# Patient Record
(demographics unavailable — no encounter records)

---

## 2025-05-20 NOTE — CONSULT LETTER
[Dear  ___] : Dear  [unfilled], [Consult Letter:] : I had the pleasure of evaluating your patient, [unfilled]. [Please see my note below.] : Please see my note below. [Consult Closing:] : Thank you very much for allowing me to participate in the care of this patient.  If you have any questions, please do not hesitate to contact me. [Sincerely,] : Sincerely, [Dear  ___] : Dear ~PEDRO, [FreeTextEntry3] : Lori Casiano M.D Pediatric neurology attending Neurofibromatosis clinic Co-director [facility] Tel: [phone] Fax: [phone]

## 2025-05-20 NOTE — BIRTH HISTORY
[At Term] : at term [ Section] : by  section [None] : there were no delivery complications [de-identified] : for breech presentation [FreeTextEntry1] : 8-9

## 2025-05-20 NOTE — ASSESSMENT
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 Exam non focal.  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

## 2025-05-20 NOTE — PLAN
[FreeTextEntry1] : Parents to call for test results All questions answered; parents report understanding    Addendum: 05/20/2025 20:34  Called [phone]; got voice mail box; left message that I will order the Brain and L spine MRI; prior auth will be obtained, and MRI should be scheduled (MRI order placed in EMR)

## 2025-05-20 NOTE — PHYSICAL EXAM
[Well-appearing] : well-appearing [Anterior fontanel- Open] : anterior fontanel- open [Anterior fontanel- Soft] : anterior fontanel- soft [Anterior fontanel- Flat] : anterior fontanel- flat [No dysmorphic facial features] : no dysmorphic facial features [Soft] : soft [Straight] : straight [No waleska or dimples] : no waleska or dimples [No deformities] : no deformities [Alert] : alert [Normal axial and appendicular muscle tone with symmetric limb movements] : normal axial and appendicular muscle tone with symmetric limb movements [2+ biceps] : 2+ biceps [Knee jerks] : knee jerks [Responds to touch and tickle] : responds to touch and tickle [de-identified] : awake, alert, in NAD [de-identified] : slight frontal bossing [de-identified] : hemangioma over the nape of neck and up towards occipital head region 8 cm in length; another hemangioma lower back, midline [de-identified] : eyes are not midline aligned on primary gaze [de-identified] : moving all 4 extremities equally

## 2025-05-20 NOTE — BIRTH HISTORY
[At Term] : at term [ Section] : by  section [None] : there were no delivery complications [de-identified] : for breech presentation [FreeTextEntry1] : 8-9

## 2025-05-20 NOTE — PHYSICAL EXAM
[Well-appearing] : well-appearing [Anterior fontanel- Open] : anterior fontanel- open [Anterior fontanel- Soft] : anterior fontanel- soft [Anterior fontanel- Flat] : anterior fontanel- flat [No dysmorphic facial features] : no dysmorphic facial features [Soft] : soft [Straight] : straight [No waleska or dimples] : no waleska or dimples [No deformities] : no deformities [Alert] : alert [Normal axial and appendicular muscle tone with symmetric limb movements] : normal axial and appendicular muscle tone with symmetric limb movements [2+ biceps] : 2+ biceps [Knee jerks] : knee jerks [Responds to touch and tickle] : responds to touch and tickle [de-identified] : awake, alert, in NAD [de-identified] : slight frontal bossing [de-identified] : hemangioma over the nape of neck and up towards occipital head region 8 cm in length; another hemangioma lower back, midline [de-identified] : eyes are not midline aligned on primary gaze [de-identified] : moving all 4 extremities equally

## 2025-05-20 NOTE — PLAN
[FreeTextEntry1] : Parents to call for test results All questions answered; parents report understanding    Addendum: 05/20/2025 20:34  Called [phone]; got voice mail box; left message that I will order the Brain and L spine MRI; prior auth will be obtained, and MRI should be scheduled (MRI order placed in EMR)

## 2025-05-20 NOTE — HISTORY OF PRESENT ILLNESS
[FreeTextEntry1] : 05/20/2025 FIRST VISIT ; KIKO is 47 day old girl, with parents for spot on the back  Mother reports NOVA has a spot on her neck and on her lower spine. PMD reassured parents that the one on the neck will resolve but PMD was concerned about the lower spine, and wanted that one to be checked due to concern about underlying spine. Parents have no concerns about NOVA

## 2025-05-20 NOTE — CONSULT LETTER
[Dear  ___] : Dear  [unfilled], [Consult Letter:] : I had the pleasure of evaluating your patient, [unfilled]. [Please see my note below.] : Please see my note below. [Consult Closing:] : Thank you very much for allowing me to participate in the care of this patient.  If you have any questions, please do not hesitate to contact me. [Sincerely,] : Sincerely, [Dear  ___] : Dear ~PEDRO, [FreeTextEntry3] : Lori Casiano M.D Pediatric neurology attending Neurofibromatosis clinic Co-director [facility] Tel: [phone] Fax: [phone]